# Patient Record
Sex: FEMALE | Race: BLACK OR AFRICAN AMERICAN | Employment: UNEMPLOYED | URBAN - METROPOLITAN AREA
[De-identification: names, ages, dates, MRNs, and addresses within clinical notes are randomized per-mention and may not be internally consistent; named-entity substitution may affect disease eponyms.]

---

## 2017-06-09 PROBLEM — Z98.51 HISTORY OF BILATERAL TUBAL LIGATION: Status: ACTIVE | Noted: 2017-06-09

## 2017-06-09 PROBLEM — F31.70 BIPOLAR AFFECTIVE DISORDER IN REMISSION (HCC): Status: ACTIVE | Noted: 2017-06-09

## 2017-07-24 ENCOUNTER — LAB ONLY (OUTPATIENT)
Dept: FAMILY MEDICINE CLINIC | Age: 27
End: 2017-07-24

## 2017-07-24 DIAGNOSIS — F43.10 POSTTRAUMATIC STRESS DISORDER: ICD-10-CM

## 2017-07-24 DIAGNOSIS — Z01.89 ROUTINE LAB DRAW: ICD-10-CM

## 2017-07-24 DIAGNOSIS — F31.9 BIPOLAR DISORDER, UNSPECIFIED (HCC): Primary | ICD-10-CM

## 2017-07-24 DIAGNOSIS — Z79.899 ENCOUNTER FOR LONG-TERM (CURRENT) USE OF OTHER MEDICATIONS: ICD-10-CM

## 2017-07-25 ENCOUNTER — TELEPHONE (OUTPATIENT)
Dept: FAMILY MEDICINE CLINIC | Age: 27
End: 2017-07-25

## 2017-07-25 LAB
ALBUMIN SERPL-MCNC: 4.2 G/DL (ref 3.5–5.5)
ALBUMIN/GLOB SERPL: 1.4 {RATIO} (ref 1.2–2.2)
ALP SERPL-CCNC: 87 IU/L (ref 39–117)
ALT SERPL-CCNC: 12 IU/L (ref 0–32)
AST SERPL-CCNC: 18 IU/L (ref 0–40)
BILIRUB SERPL-MCNC: <0.2 MG/DL (ref 0–1.2)
BUN SERPL-MCNC: 9 MG/DL (ref 6–20)
BUN/CREAT SERPL: 15 (ref 9–23)
CALCIUM SERPL-MCNC: 9.1 MG/DL (ref 8.7–10.2)
CHLORIDE SERPL-SCNC: 102 MMOL/L (ref 96–106)
CO2 SERPL-SCNC: 22 MMOL/L (ref 18–29)
CREAT SERPL-MCNC: 0.62 MG/DL (ref 0.57–1)
ERYTHROCYTE [DISTWIDTH] IN BLOOD BY AUTOMATED COUNT: 15.4 % (ref 12.3–15.4)
EST. AVERAGE GLUCOSE BLD GHB EST-MCNC: 103 MG/DL
GLOBULIN SER CALC-MCNC: 3.1 G/DL (ref 1.5–4.5)
GLUCOSE SERPL-MCNC: 87 MG/DL (ref 65–99)
HBA1C MFR BLD: 5.2 % (ref 4.8–5.6)
HCT VFR BLD AUTO: 35.5 % (ref 34–46.6)
HGB BLD-MCNC: 11.4 G/DL (ref 11.1–15.9)
LITHIUM SERPL-SCNC: 0.2 MMOL/L (ref 0.6–1.2)
MCH RBC QN AUTO: 28.1 PG (ref 26.6–33)
MCHC RBC AUTO-ENTMCNC: 32.1 G/DL (ref 31.5–35.7)
MCV RBC AUTO: 87 FL (ref 79–97)
PLATELET # BLD AUTO: 190 X10E3/UL (ref 150–379)
POTASSIUM SERPL-SCNC: 4.7 MMOL/L (ref 3.5–5.2)
PROT SERPL-MCNC: 7.3 G/DL (ref 6–8.5)
RBC # BLD AUTO: 4.06 X10E6/UL (ref 3.77–5.28)
SODIUM SERPL-SCNC: 141 MMOL/L (ref 134–144)
T3 SERPL-MCNC: 112 NG/DL (ref 71–180)
T4 FREE SERPL-MCNC: 1.01 NG/DL (ref 0.82–1.77)
TSH SERPL DL<=0.005 MIU/L-ACNC: 0.79 UIU/ML (ref 0.45–4.5)
WBC # BLD AUTO: 7.4 X10E3/UL (ref 3.4–10.8)

## 2017-07-25 NOTE — PROGRESS NOTES
Labs requested by [facility]. courtesey orders placed.   Fax results to [phone]

## 2017-07-25 NOTE — TELEPHONE ENCOUNTER
----- Message from Elli Cooney NP sent at 7/25/2017 10:20 AM EDT -----  Labs requested by [facility]. courtesey orders placed.   Fax results to [phone]

## 2017-07-25 NOTE — TELEPHONE ENCOUNTER
Informed pt of labs and that her Lithium level were low but would send over to [facility] as requested. They can review it with her. All questions were answered and pt verbalized understanding of conversation.

## 2018-07-17 ENCOUNTER — OFFICE VISIT (OUTPATIENT)
Dept: FAMILY MEDICINE CLINIC | Age: 28
End: 2018-07-17

## 2018-07-17 ENCOUNTER — TELEPHONE (OUTPATIENT)
Dept: FAMILY MEDICINE CLINIC | Age: 28
End: 2018-07-17

## 2018-07-17 VITALS
HEART RATE: 84 BPM | TEMPERATURE: 98.4 F | HEIGHT: 69 IN | BODY MASS INDEX: 42.36 KG/M2 | SYSTOLIC BLOOD PRESSURE: 112 MMHG | DIASTOLIC BLOOD PRESSURE: 75 MMHG | RESPIRATION RATE: 16 BRPM | WEIGHT: 286 LBS | OXYGEN SATURATION: 97 %

## 2018-07-17 DIAGNOSIS — Z77.120 MOLD SUSPECTED EXPOSURE: Primary | ICD-10-CM

## 2018-07-17 DIAGNOSIS — F31.70 BIPOLAR AFFECTIVE DISORDER IN REMISSION (HCC): ICD-10-CM

## 2018-07-17 DIAGNOSIS — Z18.9 RETAINED SUTURE, INITIAL ENCOUNTER: ICD-10-CM

## 2018-07-17 DIAGNOSIS — T81.89XA RETAINED SUTURE, INITIAL ENCOUNTER: ICD-10-CM

## 2018-07-17 RX ORDER — LITHIUM CARBONATE 450 MG/1
450 TABLET ORAL 2 TIMES DAILY
COMMUNITY

## 2018-07-17 RX ORDER — HYDROXYZINE PAMOATE 25 MG/1
25 CAPSULE ORAL
COMMUNITY

## 2018-07-17 NOTE — MR AVS SNAPSHOT
[address] 
 
 
 [address] [facility] [address] [address] 
[phone] Patient: Velva Bloch MRN: [id number] OYB:[id number] Visit Information Date & Time Provider Department Dept. Phone Encounter #  
 7/17/2018 11:45 AM Jon Lindsey NP Diana Mast [employer] [phone] [id number] Follow-up Instructions Return if symptoms worsen or fail to improve. Your Appointments 7/17/2018 11:45 AM  
Follow Up with Jon Lindsey NP [address] ([address]) Appt Note: Not feeling possible due to mold, Pt of MIRZA Salomon; RS from 7/12/18 per pt  
 [address] [facility] [address] [address]  
[phone]  
  
   
 [address] [facility] [address] Upcoming Health Maintenance Date Due  
 HPV Age 9Y-34Y (1 of 1 - Female 3 Dose Series) 11/10/2003 Pneumococcal 19-64 Medium Risk (1 of 1 - PPSV23) 11/10/2011 DTaP/Tdap/Td series (1 - Tdap) 11/10/2013 PAP AKA CERVICAL CYTOLOGY 11/10/2013 Influenza Age 5 to Adult 8/1/2018 Allergies as of 7/17/2018  Review Complete On: 7/17/2018 By: Toño Delaney LPN No Known Allergies Current Immunizations  Never Reviewed Name Date Influenza Vaccine (Quad) PF 11/23/2016 Not reviewed this visit You Were Diagnosed With   
  
 Codes Comments Mold suspected exposure    -  Primary ICD-10-CM: R80.399 ICD-9-CM: V87.31 Bipolar affective disorder in remission [facility])     ICD-10-CM: F31.70 ICD-9-CM: 296.80 Retained suture, initial encounter     ICD-10-CM: T81.89XA, Z18.9 ICD-9-CM: 998.89, V90.9 Vitals BP Pulse Temp Resp Height(growth percentile) Weight(growth percentile) 112/75 (BP 1 Location: Left arm, BP Patient Position: Sitting) 84 98.4 °F (36.9 °C) (Oral) 16 5' 9\" (1.753 m) 286 lb (129.7 kg) SpO2 BMI OB Status Smoking Status 97% 42.23 kg/m2 Injection Current Every Day Smoker BMI and BSA Data Body Mass Index Body Surface Area  
 42.23 kg/m 2 2.51 m 2 Preferred Pharmacy Pharmacy Name Phone Gret[facility] Cuad[facility] [address] [place] [address] Alaina Peralta Your Updated Medication List  
  
   
This list is accurate as of 7/17/18 11:44 AM.  Always use your most recent med list.  
  
  
  
  
 DEPO-PROVERA IM  
by IntraMUSCular route every three (3) months. Last injection: 12/2016 * lithium carbonate 300 mg tablet Take 100 mg by mouth nightly. 1-2 tabs at bedtime * lithium carbonate 150 mg capsule Take 150 mg by mouth nightly. * lithium carbonate  mg CR tablet Commonly known as:  ESKALITH CR Take 450 mg by mouth two (2) times a day. SEROquel 300 mg tablet Generic drug:  QUEtiapine Take 300 mg by mouth nightly. 1/2 - 1 tab at night for sleep VISTARIL 25 mg capsule Generic drug:  hydrOXYzine pamoate Take 25 mg by mouth nightly. 1-2 caps * Notice: This list has 3 medication(s) that are the same as other medications prescribed for you. Read the directions carefully, and ask your doctor or other care provider to review them with you. We Performed the Following REFERRAL TO ALLERGY [REF5 Custom] Follow-up Instructions Return if symptoms worsen or fail to improve. Referral Information Referral ID Referred By Referred To  
  
 [id number] Clarice BARROS Not Available Visits Status Start Date End Date 1 New Request 7/17/18 7/17/19 If your referral has a status of pending review or denied, additional information will be sent to support the outcome of this decision. Patient Instructions You should get your apartment testing for mold surface and air sampling. I am referring you to allergist for testing for you. We have tried to find you an hand specialist and no one will take your insurance. Please call your insurance to see who they refer to and we can set this up, it might be out of town but this is important. You may need to go to ED for suture removal if you cannot get in with surgeon within next week. Introducing [facility] & HEALTH SERVICES! [facility] introduces [facility] patient portal. Now you can access parts of your medical record, email your doctor's office, and request medication refills online. 1. In your internet browser, go to [email or web address] 2. Click on the First Time User? Click Here link in the Sign In box. You will see the New Member Sign Up page. 3. Enter your [facility] Access Code exactly as it appears below. You will not need to use this code after youve completed the sign-up process. If you do not sign up before the expiration date, you must request a new code. · [facility] Access Code: [email or web address] Expires: 10/15/2018 11:44 AM 
 
4. Enter the last four digits of your Social Security Number (xxxx) and Date of Birth (mm/dd/yyyy) as indicated and click Submit. You will be taken to the next sign-up page. 5. Create a [facility] ID. This will be your [facility] login ID and cannot be changed, so think of one that is secure and easy to remember. 6. Create a [facility] password. You can change your password at any time. 7. Enter your Password Reset Question and Answer. This can be used at a later time if you forget your password. 8. Enter your e-mail address. You will receive e-mail notification when new information is available in [address] 9. Click Sign Up. You can now view and download portions of your medical record. 10. Click the Download Summary menu link to download a portable copy of your medical information. If you have questions, please visit the Frequently Asked Questions section of the [facility] website.  Remember, [facility] is NOT to be used for urgent needs. For medical emergencies, dial 911. Now available from your iPhone and Android! Please provide this summary of care documentation to your next provider. Your primary care clinician is listed as Mariely Ludwig. If you have any questions after today's visit, please call [phone].

## 2018-07-17 NOTE — PATIENT INSTRUCTIONS
You should get your apartment testing for mold surface and air sampling. I am referring you to allergist for testing for you. We have tried to find you an hand specialist and no one will take your insurance. Please call your insurance to see who they refer to and we can set this up, it might be out of town but this is important. You may need to go to ED for suture removal if you cannot get in with surgeon within next week.

## 2018-07-17 NOTE — TELEPHONE ENCOUNTER
Shannan w/ [facility] stated pt called her stating she was told she needs surgery on her finger. They need more information as to what kind of surgery so they can find a surgeon for her. Please call pt to discuss.

## 2018-07-17 NOTE — PROGRESS NOTES
Chief Complaint   Patient presents with    Headache     Not feeling well. Wants blood work to be tested for mold.  Wound Check     Sutures in right first finger since 6/12/18     she is a 24y.o. year old female who presents for evaluation of possible mold exposure, retained sutures, tendon laceration and medication questions. Pt has fatigue, non specific, different times during the day. Feels it is because she has mold in her apartment, wants \"blood work to see if I do\". Discussed mold allergy testing and apartment testing to verify. Pt will follow up with allergist and if has mold allergy will continue with possible apartment testing. Pt was also seen in ED 6/11/2018 and follow up with surgeon 6/26/2018. Pt had laceration to L index finger with suspected tendon laceration which ED placed 7  Standard sutures and mattress suture, was seen in follow up with surgeon and sutures were not removed with plans for surgical repair of tendon. After visit with surgeon, she found out they do not take her insurance so surgical repair never done. Pt did not get sutures removed and they are still in place with extensive skin overgrowth. No s/sx of infection    Attempted suture removal, able to take out 1 suture, multiple calls to hand specialist and none will take her insurance, instructed pt to call her insurance and ask the next steps she should take. Pt has also not been on any of her psych medications x 3 months,  Wanted lithium levels drawn to take to [facility] discussed if haven't been on for 3 months she does not need levels drawn. Encouraged to follow up with [facility] immediately. Reviewed and agree with Nurse Note duplicated in this note. Reviewed PmHx, RxHx, FmHx, SocHx, AllgHx and updated in chart.     Patient Labs were reviewed: yes    Patient Past Records were reviewed:  yes    Review of Systems - negative except as listed above    Objective:     Vitals:    07/17/18 1030   BP: 112/75   Pulse: 84 Resp: 16   Temp: 98.4 °F (36.9 °C)   TempSrc: Oral   SpO2: 97%   Weight: 286 lb (129.7 kg)   Height: 5' 9\" (1.753 m)     Physical Examination: General appearance - alert, well appearing, and in no distress and oriented to person, place, and time  Mental status - alert, oriented to person, place, and time  Eyes - pupils equal and reactive, extraocular eye movements intact  Ears - bilateral TM's and external ear canals normal  Lymphatics - no palpable lymphadenopathy, no hepatosplenomegaly  Chest - clear to auscultation, no wheezes, rales or rhonchi, symmetric air entry  Heart - normal rate, regular rhythm, normal S1, S2, no murmurs, rubs, clicks or gallops  Extremities - peripheral pulses normal, no pedal edema, no clubbing or cyanosis  Skin - L index finger palmar aspect with noted suture knots present, finger tender with light palpation, crusting along suture line, no erythema, drainage noted. 1 suture (s) were removed by  APC with difficulty. Wound edges were well approximated. Steri-strips were not used. There was not evidence of infection. Patient did not tolerate well.  6 visible retained sutures not removed as pt unable to tolerate procedure. Needs follow up with hand specialist.      Assessment/ Plan:   Diagnoses and all orders for this visit:    1. Mold suspected exposure  -     REFERRAL TO ALLERGY    2. Bipolar affective disorder in remission ([facility]    3. Retained suture, initial encounter       Encounter Diagnoses   Name Primary?  Mold suspected exposure Yes    Bipolar affective disorder in remission ([facility]     Retained suture, initial encounter      Orders Placed This Encounter    REFERRAL TO ALLERGY    lithium carbonate CR (ESKALITH CR) 450 mg CR tablet    hydrOXYzine pamoate (VISTARIL) 25 mg capsule     Follow-up Disposition: Not on File  Patient Instructions   You should get your apartment testing for mold surface and air sampling.           I have discussed the diagnosis with the patient and the intended plan as seen in the above orders. The patient has received an After-Visit Summary and questions were answered concerning future plans. Medication Side Effects and Warnings were discussed with patient: yes    Greater than 50% of this 25 min visit was spent couseling the patient about     ICD-10-CM ICD-9-CM    1. Mold suspected exposure Z77.120 V87.31 REFERRAL TO ALLERGY   2. [facility] F31.70 296.80    3.  Retained suture, initial encounter T81.89XA 998.89     Z18.9 V90.9                Karie Loja NP-C  [employer]

## 2018-07-17 NOTE — PROGRESS NOTES
Chief Complaint   Patient presents with    Headache     Not feeling well. Wants blood work to be tested for mold.  Wound Check     Sutures in right first finger since 6/12/18     1. Have you been to the ER, urgent care clinic since your last visit? Hospitalized since your last visit? Yes Reason for visit: Lindsay Fernández for laceration of finger    2. Have you seen or consulted any other health care providers outside of the [address] [place] [address] since your last visit? Include any pap smears or colon screening.  Yes Reason for visit: Dr Michell Moreno and [facility] Tee BALLESTEROS

## 2018-07-18 NOTE — TELEPHONE ENCOUNTER
Left a message for patient to call back. Need to inform her that per the notes from the ED/plastic surgeon, patient needs nerve and tendon repair to her left index finger. Waiting for return call.

## 2018-07-18 NOTE — TELEPHONE ENCOUNTER
Patient called back and was informed that she needs to contact [facility] to let them know that per the ED/plastic surgeon, she needs a tendon and nerve repair. Patient verbalized understanding of the information given.

## 2022-03-18 PROBLEM — Z98.51 HISTORY OF BILATERAL TUBAL LIGATION: Status: ACTIVE | Noted: 2017-06-09

## 2022-03-19 PROBLEM — F31.70 BIPOLAR AFFECTIVE DISORDER IN REMISSION (HCC): Status: ACTIVE | Noted: 2017-06-09

## 2022-11-21 ENCOUNTER — OFFICE VISIT (OUTPATIENT)
Dept: FAMILY MEDICINE CLINIC | Age: 32
End: 2022-11-21
Payer: MEDICAID

## 2022-11-21 VITALS
SYSTOLIC BLOOD PRESSURE: 137 MMHG | WEIGHT: 285.8 LBS | BODY MASS INDEX: 42.33 KG/M2 | OXYGEN SATURATION: 100 % | TEMPERATURE: 97.4 F | HEART RATE: 79 BPM | RESPIRATION RATE: 18 BRPM | HEIGHT: 69 IN | DIASTOLIC BLOOD PRESSURE: 89 MMHG

## 2022-11-21 DIAGNOSIS — Z00.00 ENCOUNTER FOR ANNUAL PHYSICAL EXAM: Primary | ICD-10-CM

## 2022-11-21 DIAGNOSIS — Z13.1 SCREENING FOR DIABETES MELLITUS: ICD-10-CM

## 2022-11-21 DIAGNOSIS — F31.70 BIPOLAR AFFECTIVE DISORDER IN REMISSION (HCC): ICD-10-CM

## 2022-11-21 DIAGNOSIS — E66.01 CLASS 3 SEVERE OBESITY DUE TO EXCESS CALORIES WITH BODY MASS INDEX (BMI) OF 40.0 TO 44.9 IN ADULT, UNSPECIFIED WHETHER SERIOUS COMORBIDITY PRESENT (HCC): ICD-10-CM

## 2022-11-21 DIAGNOSIS — Z23 ENCOUNTER FOR IMMUNIZATION: ICD-10-CM

## 2022-11-21 DIAGNOSIS — R53.83 FATIGUE, UNSPECIFIED TYPE: ICD-10-CM

## 2022-11-21 DIAGNOSIS — Z13.220 SCREENING FOR HYPERLIPIDEMIA: ICD-10-CM

## 2022-11-21 PROCEDURE — 99385 PREV VISIT NEW AGE 18-39: CPT | Performed by: STUDENT IN AN ORGANIZED HEALTH CARE EDUCATION/TRAINING PROGRAM

## 2022-11-21 PROCEDURE — 90686 IIV4 VACC NO PRSV 0.5 ML IM: CPT | Performed by: STUDENT IN AN ORGANIZED HEALTH CARE EDUCATION/TRAINING PROGRAM

## 2022-11-21 PROCEDURE — 99203 OFFICE O/P NEW LOW 30 MIN: CPT | Performed by: STUDENT IN AN ORGANIZED HEALTH CARE EDUCATION/TRAINING PROGRAM

## 2022-11-21 NOTE — PROGRESS NOTES
1. \"Have you been to the ER, urgent care clinic since your last visit? Hospitalized since your last visit? \" Patient reports 4 months ago, [facility], for vaginal irritation     2. \"Have you seen or consulted any other health care providers outside of the [address] [place] [address] since your last visit? \" No     3. For patients aged 39-70: Has the patient had a colonoscopy / FIT/ Cologuard? NA - based on age      If the patient is female:    4. For patients aged 41-77: Has the patient had a mammogram within the past 2 years? NA - based on age or sex      11. For patients aged 21-65: Has the patient had a pap smear?  Yes - no Care Gap present

## 2022-11-21 NOTE — PROGRESS NOTES
Subjective:   29 y.o. female for Well Woman Check. Her gyne and breast care is done elsewhere by her Ob-Gyne physician. Patient Active Problem List   Diagnosis Code    History of bilateral tubal ligation Z98.51    Bipolar affective disorder in remission [facility]) F31.70     Patient Active Problem List    Diagnosis Date Noted    History of bilateral tubal ligation 06/09/2017    Bipolar affective disorder in remission ([facility] 06/09/2017     Current Outpatient Medications   Medication Sig Dispense Refill    hydrOXYzine pamoate (VISTARIL) 25 mg capsule Take 25 mg by mouth nightly. 1-2 caps      lithium carbonate 150 mg capsule Take 150 mg by mouth nightly. lithium carbonate CR (ESKALITH CR) 450 mg CR tablet Take 450 mg by mouth two (2) times a day. (Patient not taking: Reported on 11/21/2022)      lithium carbonate 300 mg tablet Take 100 mg by mouth nightly.  1-2 tabs at bedtime (Patient not taking: Reported on 11/21/2022)       No Known Allergies  Past Medical History:   Diagnosis Date    Abnormal vaginal bleeding     Anemia     BMI 35.0-35.9,adult     35.5    Gestational diabetes     Pelvic pain     Psychiatric disorder     bipolar    Reading disability, developmental     Request for sterilization      Past Surgical History:   Procedure Laterality Date    HX CYST INCISION AND DRAINAGE Right     breast abscess    HX GYN      4 vaginal deliveries    HX TUBAL LIGATION  01/25/2017     Family History   Problem Relation Age of Onset    Hypertension Mother     Gall Bladder Disease Mother     No Known Problems Father     Cancer Maternal Grandmother         cancer    Diabetes Maternal Grandmother     Hypertension Maternal Grandmother     Heart Failure Paternal Grandfather      Social History     Tobacco Use    Smoking status: Former     Types: Cigars    Smokeless tobacco: Never    Tobacco comments:     1-2 black & mild cigars a day   Substance Use Topics    Alcohol use: Yes     Comment: occasionally        Specific concerns today: None. Review of Systems  Constitutional: negative  Eyes: negative  Ears, nose, mouth, throat, and face: negative  Respiratory: negative  Cardiovascular: negative  Gastrointestinal: negative  Genitourinary:negative  Integument/breast: negative  Hematologic/lymphatic: negative  Musculoskeletal:negative  Neurological: negative  Behavioral/Psych: negative  Endocrine: negative  Allergic/Immunologic: negative    Objective:   Blood pressure 137/89, pulse 79, temperature 97.4 °F (36.3 °C), temperature source Temporal, resp. rate 18, height 5' 9\" (1.753 m), weight 285 lb 12.8 oz (129.6 kg), SpO2 100 %.      Physical Examination:   General appearance - alert, well appearing, and in no distress, acyanotic, in no respiratory distress, well hydrated, and morbidly obese  Mental status - alert, oriented to person, place, and time  Eyes - pupils equal and reactive, extraocular eye movements intact, sclera anicteric, left eye normal, right eye normal  Ears - bilateral TM's and external ear canals normal, right ear normal, left ear normal  Nose - normal and patent, no erythema, discharge or polyps  Mouth - mucous membranes moist, pharynx normal without lesions  Neck - supple, no significant adenopathy  Lymphatics - no palpable lymphadenopathy, no hepatosplenomegaly  Chest - clear to auscultation, no wheezes, rales or rhonchi, symmetric air entry  Heart - normal rate, regular rhythm, normal S1, S2, no murmurs, rubs, clicks or gallops  Abdomen - soft, nontender, nondistended, no masses or organomegaly  bowel sounds normal  Back exam - full range of motion, no tenderness, palpable spasm or pain on motion  Neurological - alert, oriented, normal speech, no focal findings or movement disorder noted  Musculoskeletal - no joint tenderness, deformity or swelling  Extremities - peripheral pulses normal, no pedal edema, no clubbing or cyanosis  Skin - normal coloration and turgor, no rashes, no suspicious skin lesions noted Assessment/Plan:   Diagnoses and all orders for this visit:    1. Encounter for annual physical exam  27-year-old female presents today for complete physical examination and establish care. Patient at this time denies any acute medical issues. And states she is in her usual state of health. 2. Class 3 severe obesity due to excess calories with body mass index (BMI) of 40.0 to 44.9 in adult, unspecified whether serious comorbidity present ([facility]  Patient's BMI is noted to be 42.21  Patient has been encouraged to complete aerobic exercise 30-40 minutes a day, at least 3-5 days a week. Aerobic exercises are activities such as brisk walking, running/jogging, dancing, biking, swimming. Patient has also been encouraged to join a gym  Healthy diet: Eat real food! Avoid or minimize all processed food. Patient will be referred to weight management. Plan is acceptable to patient.       -     REFERRAL TO WEIGHT LOSS    3. Screening for diabetes mellitus  -     HEMOGLOBIN A1C WITH EAG; Future    4. Screening for hyperlipidemia  -     LIPID PANEL; Future    5. Fatigue, unspecified type  -     HEPATITIS C AB; Future  -     CBC W/O DIFF; Future  -     METABOLIC PANEL, COMPREHENSIVE; Future    6. Encounter for immunization  -     INFLUENZA, FLUARIX, FLULAVAL, FLUZONE (AGE 6 MO+), AFLURIA(AGE 3Y+) IM, PF, 0.5 ML    7. Bipolar affective disorder in remission [facility])  Patient currently follows with a psychiatrist at the possible behavioral center. Patient is currently on lithium. lose weight, increase physical activity, follow low fat diet, follow low salt diet, routine labs ordered, have labs drawn prior to ROV, call if any problems    Follow-up and Dispositions    Return in about 1 year (around 11/21/2023) for well visit.

## 2024-02-19 ENCOUNTER — TELEPHONE (OUTPATIENT)
Age: 34
End: 2024-02-19

## 2024-02-28 ENCOUNTER — TELEPHONE (OUTPATIENT)
Age: 34
End: 2024-02-28

## 2024-02-28 NOTE — TELEPHONE ENCOUNTER
Warning Alert the patient has a counselor that helps her with her appts and was very rude using profanity over the phone about sending the patient here on the wrong appt date. The counselor seems to be a little unstable just a warning if she comes in the office.

## 2024-02-29 ENCOUNTER — APPOINTMENT (OUTPATIENT)
Facility: HOSPITAL | Age: 34
End: 2024-02-29
Payer: MEDICAID

## 2024-02-29 ENCOUNTER — OFFICE VISIT (OUTPATIENT)
Age: 34
End: 2024-02-29
Payer: MEDICAID

## 2024-02-29 ENCOUNTER — HOSPITAL ENCOUNTER (EMERGENCY)
Facility: HOSPITAL | Age: 34
Discharge: HOME | End: 2024-02-29
Payer: MEDICAID

## 2024-02-29 VITALS
DIASTOLIC BLOOD PRESSURE: 86 MMHG | BODY MASS INDEX: 39.99 KG/M2 | WEIGHT: 270 LBS | HEART RATE: 85 BPM | SYSTOLIC BLOOD PRESSURE: 136 MMHG | HEIGHT: 69 IN | RESPIRATION RATE: 18 BRPM | OXYGEN SATURATION: 98 % | TEMPERATURE: 97.3 F

## 2024-02-29 VITALS
BODY MASS INDEX: 38.51 KG/M2 | HEART RATE: 86 BPM | TEMPERATURE: 98.5 F | HEIGHT: 69 IN | WEIGHT: 260 LBS | SYSTOLIC BLOOD PRESSURE: 121 MMHG | OXYGEN SATURATION: 98 % | DIASTOLIC BLOOD PRESSURE: 83 MMHG | RESPIRATION RATE: 16 BRPM

## 2024-02-29 DIAGNOSIS — E66.01 MORBID (SEVERE) OBESITY DUE TO EXCESS CALORIES (HCC): ICD-10-CM

## 2024-02-29 DIAGNOSIS — Z00.00 WELL ADULT EXAM: ICD-10-CM

## 2024-02-29 DIAGNOSIS — M54.2 NECK PAIN: Primary | ICD-10-CM

## 2024-02-29 DIAGNOSIS — Z13.1 SCREENING FOR DIABETES MELLITUS (DM): ICD-10-CM

## 2024-02-29 DIAGNOSIS — Z11.59 ENCOUNTER FOR HEPATITIS C SCREENING TEST FOR LOW RISK PATIENT: ICD-10-CM

## 2024-02-29 DIAGNOSIS — Z11.4 ENCOUNTER FOR SCREENING FOR HIV: ICD-10-CM

## 2024-02-29 DIAGNOSIS — R10.31 RIGHT LOWER QUADRANT ABDOMINAL PAIN: Primary | ICD-10-CM

## 2024-02-29 DIAGNOSIS — L03.113 CELLULITIS OF RIGHT UPPER EXTREMITY: ICD-10-CM

## 2024-02-29 DIAGNOSIS — R51.9 NONINTRACTABLE HEADACHE, UNSPECIFIED CHRONICITY PATTERN, UNSPECIFIED HEADACHE TYPE: ICD-10-CM

## 2024-02-29 DIAGNOSIS — Z13.220 SCREENING FOR HYPERLIPIDEMIA: ICD-10-CM

## 2024-02-29 PROCEDURE — 72125 CT NECK SPINE W/O DYE: CPT

## 2024-02-29 PROCEDURE — 99284 EMERGENCY DEPT VISIT MOD MDM: CPT

## 2024-02-29 PROCEDURE — 70450 CT HEAD/BRAIN W/O DYE: CPT

## 2024-02-29 PROCEDURE — 96372 THER/PROPH/DIAG INJ SC/IM: CPT

## 2024-02-29 PROCEDURE — 6360000002 HC RX W HCPCS

## 2024-02-29 PROCEDURE — 99214 OFFICE O/P EST MOD 30 MIN: CPT | Performed by: STUDENT IN AN ORGANIZED HEALTH CARE EDUCATION/TRAINING PROGRAM

## 2024-02-29 RX ORDER — KETOROLAC TROMETHAMINE 15 MG/ML
15 INJECTION, SOLUTION INTRAMUSCULAR; INTRAVENOUS
Status: COMPLETED | OUTPATIENT
Start: 2024-02-29 | End: 2024-02-29

## 2024-02-29 RX ORDER — CEPHALEXIN 500 MG/1
500 CAPSULE ORAL 4 TIMES DAILY
Qty: 20 CAPSULE | Refills: 0 | Status: SHIPPED | OUTPATIENT
Start: 2024-02-29 | End: 2024-03-05

## 2024-02-29 RX ORDER — IBUPROFEN 800 MG/1
800 TABLET ORAL 2 TIMES DAILY PRN
Qty: 60 TABLET | Refills: 0 | Status: SHIPPED | OUTPATIENT
Start: 2024-02-29

## 2024-02-29 RX ORDER — CYCLOBENZAPRINE HCL 10 MG
10 TABLET ORAL NIGHTLY PRN
Qty: 10 TABLET | Refills: 0 | Status: SHIPPED | OUTPATIENT
Start: 2024-02-29 | End: 2024-03-10

## 2024-02-29 RX ADMIN — KETOROLAC TROMETHAMINE 15 MG: 15 INJECTION, SOLUTION INTRAMUSCULAR; INTRAVENOUS at 22:55

## 2024-02-29 SDOH — ECONOMIC STABILITY: FOOD INSECURITY: WITHIN THE PAST 12 MONTHS, YOU WORRIED THAT YOUR FOOD WOULD RUN OUT BEFORE YOU GOT MONEY TO BUY MORE.: OFTEN TRUE

## 2024-02-29 SDOH — ECONOMIC STABILITY: INCOME INSECURITY: HOW HARD IS IT FOR YOU TO PAY FOR THE VERY BASICS LIKE FOOD, HOUSING, MEDICAL CARE, AND HEATING?: NOT VERY HARD

## 2024-02-29 SDOH — ECONOMIC STABILITY: FOOD INSECURITY: WITHIN THE PAST 12 MONTHS, THE FOOD YOU BOUGHT JUST DIDN'T LAST AND YOU DIDN'T HAVE MONEY TO GET MORE.: SOMETIMES TRUE

## 2024-02-29 SDOH — ECONOMIC STABILITY: HOUSING INSECURITY
IN THE LAST 12 MONTHS, WAS THERE A TIME WHEN YOU DID NOT HAVE A STEADY PLACE TO SLEEP OR SLEPT IN A SHELTER (INCLUDING NOW)?: NO

## 2024-02-29 ASSESSMENT — ENCOUNTER SYMPTOMS
TROUBLE SWALLOWING: 0
DIARRHEA: 0
EYE ITCHING: 0
CONSTIPATION: 0
BELCHING: 1
COUGH: 0
RHINORRHEA: 0
ABDOMINAL PAIN: 1
SORE THROAT: 0
VOICE CHANGE: 0
WHEEZING: 0
NAUSEA: 1
PHOTOPHOBIA: 0
SHORTNESS OF BREATH: 0
VOMITING: 1
BACK PAIN: 0
EYE DISCHARGE: 0

## 2024-02-29 ASSESSMENT — PATIENT HEALTH QUESTIONNAIRE - PHQ9
5. POOR APPETITE OR OVEREATING: 2
7. TROUBLE CONCENTRATING ON THINGS, SUCH AS READING THE NEWSPAPER OR WATCHING TELEVISION: 2
2. FEELING DOWN, DEPRESSED OR HOPELESS: 1
SUM OF ALL RESPONSES TO PHQ QUESTIONS 1-9: 12
SUM OF ALL RESPONSES TO PHQ QUESTIONS 1-9: 12
10. IF YOU CHECKED OFF ANY PROBLEMS, HOW DIFFICULT HAVE THESE PROBLEMS MADE IT FOR YOU TO DO YOUR WORK, TAKE CARE OF THINGS AT HOME, OR GET ALONG WITH OTHER PEOPLE: 1
6. FEELING BAD ABOUT YOURSELF - OR THAT YOU ARE A FAILURE OR HAVE LET YOURSELF OR YOUR FAMILY DOWN: 0
SUM OF ALL RESPONSES TO PHQ QUESTIONS 1-9: 12
9. THOUGHTS THAT YOU WOULD BE BETTER OFF DEAD, OR OF HURTING YOURSELF: 0
3. TROUBLE FALLING OR STAYING ASLEEP: 3
SUM OF ALL RESPONSES TO PHQ9 QUESTIONS 1 & 2: 2
4. FEELING TIRED OR HAVING LITTLE ENERGY: 3
1. LITTLE INTEREST OR PLEASURE IN DOING THINGS: 1
SUM OF ALL RESPONSES TO PHQ QUESTIONS 1-9: 12
8. MOVING OR SPEAKING SO SLOWLY THAT OTHER PEOPLE COULD HAVE NOTICED. OR THE OPPOSITE, BEING SO FIGETY OR RESTLESS THAT YOU HAVE BEEN MOVING AROUND A LOT MORE THAN USUAL: 0

## 2024-02-29 ASSESSMENT — PAIN - FUNCTIONAL ASSESSMENT: PAIN_FUNCTIONAL_ASSESSMENT: 0-10

## 2024-02-29 ASSESSMENT — PAIN SCALES - GENERAL
PAINLEVEL_OUTOF10: 8
PAINLEVEL_OUTOF10: 8

## 2024-02-29 ASSESSMENT — PAIN DESCRIPTION - LOCATION
LOCATION: NECK
LOCATION: NECK

## 2024-02-29 NOTE — PROGRESS NOTES
Subjective:      Patient ID: Cookie Myers is a 33 y.o. female.    Pleasant 33-year-old female with past medical history of obesity BMI of 38.40, and bipolar affective disorder presents today with complaints of right forearm pain x 1 week and right lower quadrant x 2 days.  Pain    Abdominal Pain  This is a new problem. The current episode started yesterday. The onset quality is sudden. The problem occurs constantly. The problem has been waxing and waning. The pain is located in the RLQ. Pain scale: 6-8/10. The pain is moderate. The abdominal pain does not radiate. Associated symptoms include belching, nausea and vomiting. Pertinent negatives include no arthralgias, constipation, diarrhea, dysuria, fever or headaches. The pain is aggravated by palpation. The pain is relieved by Recumbency. She has tried nothing for the symptoms.   Arm Pain   The incident occurred 5 to 7 days ago. The incident occurred at home. There was no injury mechanism. The pain is present in the right forearm. The quality of the pain is described as burning. The pain does not radiate. The pain is at a severity of 6/10. The pain is moderate. The pain has been Constant since the incident. Pertinent negatives include no chest pain. The symptoms are aggravated by movement and palpation. She has tried acetaminophen for the symptoms. The treatment provided mild relief.       Review of Systems   Constitutional:  Negative for activity change, appetite change, fatigue and fever.   HENT:  Negative for congestion, hearing loss, postnasal drip, rhinorrhea, sore throat, trouble swallowing and voice change.    Eyes:  Negative for photophobia, discharge, itching and visual disturbance.   Respiratory:  Negative for cough, shortness of breath and wheezing.    Cardiovascular:  Negative for chest pain, palpitations and leg swelling.   Gastrointestinal:  Positive for abdominal pain, nausea and vomiting. Negative for constipation and diarrhea.   Genitourinary:

## 2024-02-29 NOTE — PATIENT INSTRUCTIONS
and financially and medically qualify, including Medicare Part D beneficiaries who require assistance with their prescription drug co-payments.   Phone toll-free: 1-802.597.8820  Website: www.[employer].TourNative    The Partnership for Prescription Assistance   What they offer:  The Partnership for Prescription Assistance can help you if you lack Prescription coverage to get the medicines you need through the public or private program that is right for you.  Phone toll-free: 1-572.916.6435  Website:  www.pparx.[employer] Drug Card Program  What they offer:   The [place] Drug Card Program gives you and your family access to a free Prescription Drug Card program and savings of up to 75% at more than 50,000 national and        Regional pharmacies.  Phone toll-free: 1-492.269.3621  Website: www.[employer]    [facility] Transportation Resources*  (Call 211 if need more resources.)      [facility]  What they offer: [facility] [employer] offers fixed routes, medical transportation, and on-demand response transit.   Accepts donations  Fare: $1.00 each way  Phone Number: 310.455.4345  Website: https://www.BuddyBet    [facility] [employer]  What they offer: In compliance with the American Disabilities Act, [place] [employer] provides a shared ride, advanced reservation, origin to destination service for disabled individuals who are unable to use regular fixed route public transportation services because of their disabilities.   Phone Number: 209.452.1332  Apply online: https://www.MetrixLab/TransitAgencyChoice.aspx or [email or web address]://[email or web address]iajaNet    Medicare Advantage Plans  Some plans may provide transportation. Members should contact the Member Services or Transportation number on the back of their insurance card for more information or to schedule transportation.    Medicaid Plans - [facility], [facility] Plus     Each

## 2024-02-29 NOTE — PROGRESS NOTES
1. \"Have you been to the ER, urgent care clinic since your last visit?  Hospitalized since your last visit?\"  Yes, [facility]    2. \"Have you seen or consulted any other health care providers outside of the [facility] since your last visit?\"  Yes      3. For patients aged 45-75: Has the patient had a colonoscopy / FIT/ Cologuard? NA - based on age      If the patient is female:    4. For patients aged 40-74: Has the patient had a mammogram within the past 2 years? NA - based on age or sex      5. For patients aged 21-65: Has the patient had a pap smear? No

## 2024-03-01 LAB
A/G RATIO: 1.5 RATIO (ref 1.1–2.6)
ALBUMIN SERPL-MCNC: 4.3 G/DL (ref 3.5–5)
ALP BLD-CCNC: 65 U/L (ref 25–115)
ALT SERPL-CCNC: 13 U/L (ref 5–40)
ANION GAP SERPL CALCULATED.3IONS-SCNC: 11 MMOL/L (ref 3–15)
AST SERPL-CCNC: 18 U/L (ref 10–37)
BASOPHILS # BLD: 1 % (ref 0–2)
BASOPHILS ABSOLUTE: 0 K/UL (ref 0–0.2)
BILIRUB SERPL-MCNC: 0.3 MG/DL (ref 0.2–1.2)
BUN BLDV-MCNC: 7 MG/DL (ref 6–22)
CALCIUM SERPL-MCNC: 9.2 MG/DL (ref 8.4–10.5)
CHLORIDE BLD-SCNC: 106 MMOL/L (ref 98–110)
CHOLESTEROL/HDL RATIO: 2.6 (ref 0–5)
CHOLESTEROL: 168 MG/DL (ref 110–200)
CO2: 24 MMOL/L (ref 20–32)
CREAT SERPL-MCNC: 0.7 MG/DL (ref 0.5–1.2)
EOSINOPHIL # BLD: 5 % (ref 0–6)
EOSINOPHILS ABSOLUTE: 0.3 K/UL (ref 0–0.5)
ESTIMATED AVERAGE GLUCOSE: 102 MG/DL (ref 91–123)
GLOBULIN: 2.8 G/DL (ref 2–4)
GLOMERULAR FILTRATION RATE: >60 ML/MIN/1.73 SQ.M.
GLUCOSE: 96 MG/DL (ref 70–99)
HBA1C MFR BLD: 5.2 % (ref 4.8–5.6)
HCT VFR BLD CALC: 35.8 % (ref 35.1–46.5)
HDLC SERPL-MCNC: 65 MG/DL
HEMOGLOBIN: 11.2 G/DL (ref 11.7–15.5)
HEPATITIS C ANTIBODY: NORMAL
HIV -1/0/2 AG/AB WITH REFLEX: NON REACTIVE
HIV INTERPRETATION: NORMAL
LDL CHOLESTEROL CALCULATED: 89 MG/DL (ref 50–99)
LDL/HDL RATIO: 1.4
LYMPHOCYTES # BLD: 34 % (ref 20–45)
LYMPHOCYTES ABSOLUTE: 2 K/UL (ref 1–4.8)
MCH RBC QN AUTO: 29 PG (ref 26–34)
MCHC RBC AUTO-ENTMCNC: 31 G/DL (ref 31–36)
MCV RBC AUTO: 94 FL (ref 80–99)
MONOCYTES ABSOLUTE: 0.4 K/UL (ref 0.1–1)
MONOCYTES: 7 % (ref 3–12)
NEUTROPHILS ABSOLUTE: 3.1 K/UL (ref 1.8–7.7)
NEUTROPHILS: 54 % (ref 40–75)
NON-HDL CHOLESTEROL: 103 MG/DL
PDW BLD-RTO: 15.9 % (ref 10–15.5)
PLATELET # BLD: 136 K/UL (ref 140–440)
PMV BLD AUTO: 13.3 FL (ref 9–13)
POTASSIUM SERPL-SCNC: 3.7 MMOL/L (ref 3.5–5.5)
RBC: 3.81 M/UL (ref 3.8–5.2)
SODIUM BLD-SCNC: 141 MMOL/L (ref 133–145)
TOTAL PROTEIN: 7.1 G/DL (ref 6.4–8.3)
TRIGL SERPL-MCNC: 66 MG/DL (ref 40–149)
VLDLC SERPL CALC-MCNC: 13 MG/DL (ref 8–30)
WBC: 5.8 K/UL (ref 4–11)

## 2024-03-01 NOTE — ED NOTES
Patient pain on discharge stable.  Discharge instructions discussed with patient/caregiver and provided to the patient/caregiver either in hard copy or electronically..  [facility]  [facility] Method [facility].  Discharged with patient.

## 2024-03-01 NOTE — ED TRIAGE NOTES
Patient comes in by EMS for complaints of neck pain that started about a week ago. The pain radiates to her head.

## 2024-03-01 NOTE — ED PROVIDER NOTES
EMERGENCY DEPARTMENT HISTORY AND PHYSICAL EXAM      Patient Name: Cookie Myers  MRN: [id number]  YOB: 1990  Provider: Celestina Jessica PA-C  PCP: Fish Torrez DO   Time/Date of evaluation: 9:48 PM EST on 2/29/24    History of Presenting Illness     Chief Complaint   Patient presents with    Neck Pain       History Provided By: Patient     History (Narative):   Cookie Myers is a 33 y.o. female with a PMHX of abnormal vaginal bleeding, anemia, pelvic disorder, bipolar disorder, developmental delay  who presents to the emergency department  by POV C/O headache, and neck pain.  Patient states that she has had a gradual onset of a headache for the past 3 days that are consistent with her typical migraine that is associated with neck tightness and decreased range of motion of neck.    She denies any fever, chills, falls, injuries, taking any medications for the symptoms, sudden onset, worst headache of life, IV drug use      Past History     Past Medical History:  Past Medical History:   Diagnosis Date    Abnormal vaginal bleeding     Anemia     BMI 35.0-35.9,adult     35.5    Gestational diabetes     Pelvic pain     Psychiatric disorder     bipolar    [place] disability, developmental     Request for sterilization        Past Surgical History:  Past Surgical History:   Procedure Laterality Date    CYST INCISION AND DRAINAGE Right     breast abscess    GYN      4 vaginal deliveries    TUBAL LIGATION  01/25/2017       Family History:  Family History   Problem Relation Age of Onset    Diabetes Maternal Grandmother     Cancer Maternal Grandmother         cancer    No Known Problems Father     Gall Bladder Disease Mother     Hypertension Mother     Hypertension Maternal Grandmother     Heart Failure Paternal Grandfather        Social History:  Social History     Tobacco Use    Smoking status: Former    Smokeless tobacco: Never   Vaping Use    Vaping Use: Never used   Substance Use Topics    Alcohol

## 2025-01-14 ENCOUNTER — TELEPHONE (OUTPATIENT)
Facility: CLINIC | Age: 35
End: 2025-01-14

## 2025-01-14 NOTE — TELEPHONE ENCOUNTER
Tried reaching out to patient to schedule overdue f/u appt,unable to leave vm due to phone kept going to a busy dial tone.